# Patient Record
Sex: MALE | Race: WHITE
[De-identification: names, ages, dates, MRNs, and addresses within clinical notes are randomized per-mention and may not be internally consistent; named-entity substitution may affect disease eponyms.]

---

## 2017-01-27 ENCOUNTER — HOSPITAL ENCOUNTER (OUTPATIENT)
Dept: HOSPITAL 83 - SDC | Age: 62
Discharge: HOME | End: 2017-01-27
Attending: INTERNAL MEDICINE
Payer: COMMERCIAL

## 2017-01-27 VITALS — SYSTOLIC BLOOD PRESSURE: 100 MMHG | DIASTOLIC BLOOD PRESSURE: 67 MMHG

## 2017-01-27 VITALS — WEIGHT: 187 LBS | BODY MASS INDEX: 26.77 KG/M2 | HEIGHT: 70 IN

## 2017-01-27 VITALS — DIASTOLIC BLOOD PRESSURE: 74 MMHG | SYSTOLIC BLOOD PRESSURE: 113 MMHG

## 2017-01-27 VITALS — DIASTOLIC BLOOD PRESSURE: 87 MMHG | SYSTOLIC BLOOD PRESSURE: 125 MMHG

## 2017-01-27 VITALS — DIASTOLIC BLOOD PRESSURE: 81 MMHG

## 2017-01-27 DIAGNOSIS — K64.9: ICD-10-CM

## 2017-01-27 DIAGNOSIS — Z82.49: ICD-10-CM

## 2017-01-27 DIAGNOSIS — Z90.49: ICD-10-CM

## 2017-01-27 DIAGNOSIS — K21.9: ICD-10-CM

## 2017-01-27 DIAGNOSIS — K92.1: ICD-10-CM

## 2017-01-27 DIAGNOSIS — K57.30: Primary | ICD-10-CM

## 2020-12-14 ENCOUNTER — HOSPITAL ENCOUNTER (OUTPATIENT)
Dept: HOSPITAL 83 - COVID19 | Age: 65
Discharge: HOME | End: 2020-12-14
Attending: NURSE PRACTITIONER
Payer: COMMERCIAL

## 2020-12-14 DIAGNOSIS — U07.1: Primary | ICD-10-CM

## 2022-04-13 ENCOUNTER — HOSPITAL ENCOUNTER (OUTPATIENT)
Dept: HOSPITAL 83 - SDC | Age: 67
Discharge: HOME | End: 2022-04-13
Attending: OPHTHALMOLOGY
Payer: MEDICARE

## 2022-04-13 VITALS — SYSTOLIC BLOOD PRESSURE: 121 MMHG | DIASTOLIC BLOOD PRESSURE: 82 MMHG

## 2022-04-13 VITALS — BODY MASS INDEX: 26.34 KG/M2 | WEIGHT: 184 LBS | HEIGHT: 70 IN

## 2022-04-13 VITALS — DIASTOLIC BLOOD PRESSURE: 83 MMHG

## 2022-04-13 VITALS — DIASTOLIC BLOOD PRESSURE: 84 MMHG

## 2022-04-13 VITALS — SYSTOLIC BLOOD PRESSURE: 135 MMHG | DIASTOLIC BLOOD PRESSURE: 87 MMHG

## 2022-04-13 DIAGNOSIS — Z79.899: ICD-10-CM

## 2022-04-13 DIAGNOSIS — H25.811: Primary | ICD-10-CM

## 2022-04-13 DIAGNOSIS — K21.9: ICD-10-CM

## 2023-12-05 ENCOUNTER — ANESTHESIA EVENT (OUTPATIENT)
Dept: GASTROENTEROLOGY | Facility: HOSPITAL | Age: 68
End: 2023-12-05
Payer: COMMERCIAL

## 2023-12-05 ENCOUNTER — HOSPITAL ENCOUNTER (OUTPATIENT)
Dept: GASTROENTEROLOGY | Facility: HOSPITAL | Age: 68
Setting detail: OUTPATIENT SURGERY
Discharge: HOME | End: 2023-12-05
Payer: COMMERCIAL

## 2023-12-05 ENCOUNTER — ANESTHESIA (OUTPATIENT)
Dept: GASTROENTEROLOGY | Facility: HOSPITAL | Age: 68
End: 2023-12-05
Payer: COMMERCIAL

## 2023-12-05 VITALS
SYSTOLIC BLOOD PRESSURE: 135 MMHG | TEMPERATURE: 96.8 F | BODY MASS INDEX: 32.65 KG/M2 | OXYGEN SATURATION: 98 % | RESPIRATION RATE: 18 BRPM | DIASTOLIC BLOOD PRESSURE: 80 MMHG | HEART RATE: 74 BPM | HEIGHT: 69 IN | WEIGHT: 220.46 LBS

## 2023-12-05 DIAGNOSIS — Z86.010 H/O ADENOMATOUS POLYP OF COLON: ICD-10-CM

## 2023-12-05 DIAGNOSIS — Z12.11 ENCOUNTER FOR SCREENING FOR MALIGNANT NEOPLASM OF COLON: Primary | ICD-10-CM

## 2023-12-05 LAB
GLUCOSE BLD MANUAL STRIP-MCNC: 104 MG/DL (ref 74–99)
GLUCOSE BLD MANUAL STRIP-MCNC: 118 MG/DL (ref 74–99)

## 2023-12-05 PROCEDURE — 88305 TISSUE EXAM BY PATHOLOGIST: CPT | Mod: TC,SUR,AHULAB | Performed by: INTERNAL MEDICINE

## 2023-12-05 PROCEDURE — 45385 COLONOSCOPY W/LESION REMOVAL: CPT | Performed by: INTERNAL MEDICINE

## 2023-12-05 PROCEDURE — 7100000010 HC PHASE TWO TIME - EACH INCREMENTAL 1 MINUTE

## 2023-12-05 PROCEDURE — A45378 PR COLONOSCOPY,DIAGNOSTIC: Performed by: NURSE ANESTHETIST, CERTIFIED REGISTERED

## 2023-12-05 PROCEDURE — 3700000002 HC GENERAL ANESTHESIA TIME - EACH INCREMENTAL 1 MINUTE

## 2023-12-05 PROCEDURE — 82947 ASSAY GLUCOSE BLOOD QUANT: CPT | Mod: 91

## 2023-12-05 PROCEDURE — A45378 PR COLONOSCOPY,DIAGNOSTIC: Performed by: ANESTHESIOLOGY

## 2023-12-05 PROCEDURE — 88305 TISSUE EXAM BY PATHOLOGIST: CPT | Performed by: PATHOLOGY

## 2023-12-05 PROCEDURE — 2500000004 HC RX 250 GENERAL PHARMACY W/ HCPCS (ALT 636 FOR OP/ED): Performed by: NURSE ANESTHETIST, CERTIFIED REGISTERED

## 2023-12-05 PROCEDURE — 7100000009 HC PHASE TWO TIME - INITIAL BASE CHARGE

## 2023-12-05 PROCEDURE — 3700000001 HC GENERAL ANESTHESIA TIME - INITIAL BASE CHARGE

## 2023-12-05 PROCEDURE — 45380 COLONOSCOPY AND BIOPSY: CPT | Performed by: INTERNAL MEDICINE

## 2023-12-05 RX ORDER — TRIAMCINOLONE ACETONIDE 0.25 MG/G
CREAM TOPICAL
COMMUNITY
Start: 2023-01-26

## 2023-12-05 RX ORDER — FENOFIBRATE 145 MG/1
145 TABLET, FILM COATED ORAL DAILY
COMMUNITY
Start: 2023-11-22

## 2023-12-05 RX ORDER — LANSOPRAZOLE 30 MG/1
30 CAPSULE, DELAYED RELEASE ORAL DAILY
COMMUNITY
Start: 2023-11-27

## 2023-12-05 RX ORDER — SODIUM CHLORIDE, SODIUM LACTATE, POTASSIUM CHLORIDE, CALCIUM CHLORIDE 600; 310; 30; 20 MG/100ML; MG/100ML; MG/100ML; MG/100ML
20 INJECTION, SOLUTION INTRAVENOUS CONTINUOUS
Status: DISCONTINUED | OUTPATIENT
Start: 2023-12-05 | End: 2023-12-06 | Stop reason: HOSPADM

## 2023-12-05 RX ORDER — LISINOPRIL 20 MG/1
20 TABLET ORAL DAILY
COMMUNITY
Start: 2023-11-22

## 2023-12-05 RX ORDER — ATORVASTATIN CALCIUM 40 MG/1
40 TABLET, FILM COATED ORAL DAILY
COMMUNITY
Start: 2023-11-22

## 2023-12-05 RX ORDER — PROPOFOL 10 MG/ML
INJECTION, EMULSION INTRAVENOUS AS NEEDED
Status: DISCONTINUED | OUTPATIENT
Start: 2023-12-05 | End: 2023-12-05

## 2023-12-05 RX ORDER — GLIPIZIDE 5 MG/1
5 TABLET ORAL 2 TIMES DAILY
COMMUNITY
Start: 2023-11-10

## 2023-12-05 RX ORDER — METFORMIN HYDROCHLORIDE 1000 MG/1
1000 TABLET ORAL 2 TIMES DAILY
COMMUNITY
Start: 2023-11-10

## 2023-12-05 RX ORDER — PROPOFOL 10 MG/ML
INJECTION, EMULSION INTRAVENOUS CONTINUOUS PRN
Status: DISCONTINUED | OUTPATIENT
Start: 2023-12-05 | End: 2023-12-05

## 2023-12-05 RX ORDER — DULAGLUTIDE 0.75 MG/.5ML
INJECTION, SOLUTION SUBCUTANEOUS
COMMUNITY
Start: 2023-11-24

## 2023-12-05 RX ORDER — SPIRONOLACTONE 25 MG/1
25 TABLET ORAL 2 TIMES DAILY
COMMUNITY
Start: 2023-11-20

## 2023-12-05 RX ADMIN — PROPOFOL 100 MG: 10 INJECTION, EMULSION INTRAVENOUS at 10:45

## 2023-12-05 RX ADMIN — SODIUM CHLORIDE, SODIUM LACTATE, POTASSIUM CHLORIDE, AND CALCIUM CHLORIDE: 600; 310; 30; 20 INJECTION, SOLUTION INTRAVENOUS at 10:44

## 2023-12-05 RX ADMIN — PROPOFOL 200 MCG/KG/MIN: 10 INJECTION, EMULSION INTRAVENOUS at 10:45

## 2023-12-05 ASSESSMENT — COLUMBIA-SUICIDE SEVERITY RATING SCALE - C-SSRS
6. HAVE YOU EVER DONE ANYTHING, STARTED TO DO ANYTHING, OR PREPARED TO DO ANYTHING TO END YOUR LIFE?: NO
1. IN THE PAST MONTH, HAVE YOU WISHED YOU WERE DEAD OR WISHED YOU COULD GO TO SLEEP AND NOT WAKE UP?: NO
2. HAVE YOU ACTUALLY HAD ANY THOUGHTS OF KILLING YOURSELF?: NO

## 2023-12-05 ASSESSMENT — PAIN SCALES - GENERAL
PAINLEVEL_OUTOF10: 0 - NO PAIN

## 2023-12-05 ASSESSMENT — PAIN - FUNCTIONAL ASSESSMENT
PAIN_FUNCTIONAL_ASSESSMENT: 0-10
PAIN_FUNCTIONAL_ASSESSMENT: 0-10

## 2023-12-05 NOTE — ANESTHESIA PREPROCEDURE EVALUATION
Patient: Kelton Warner    Procedure Information       Date/Time: 12/05/23 1000    Scheduled providers: Emi Patino MD; Joey Bloom MD    Procedure: COLONOSCOPY    Location: [facility]            Relevant Problems   Anesthesia   No history of complications History of anesthesia complications       Clinical information reviewed:   Tobacco  Allergies  Meds   Med Hx  Surg Hx            NPO/Void Status  Date of Last Liquid: 12/05/23  Time of Last Liquid: 0600  Date of Last Solid: 12/03/23  Time of Last Solid: 2300  Last Intake Type: Clear fluids  Time of Last Void: 0600           Past Medical History:   Diagnosis Date    Diabetes mellitus (CMS/HCC)     GERD (gastroesophageal reflux disease)     Hypertension     Hypokalemic periodic paralysis     primarily lower extremity weakness      Past Surgical History:   Procedure Laterality Date    HIATAL HERNIA REPAIR      KNEE ARTHROSCOPY W/ MENISCAL REPAIR      ROTATOR CUFF REPAIR      TONSILLECTOMY       Social History     Tobacco Use    Smoking status: Every Day     Packs/day: .5     Types: Cigarettes    Smokeless tobacco: Never   Substance Use Topics    Alcohol use: Never    Drug use: Never      Current Outpatient Medications   Medication Instructions    atorvastatin (LIPITOR) 40 mg, oral, Daily    dulaglutide (TRULICITY SUBQ) subcutaneous    fenofibrate (TRICOR) 145 mg, oral, Daily    glipiZIDE (GLUCOTROL) 5 mg, oral, 2 times daily    lansoprazole (PREVACID) 30 mg, oral, Daily    lisinopril 20 mg, oral, Daily    metFORMIN (GLUCOPHAGE) 1,000 mg, oral, 2 times daily    spironolactone (ALDACTONE) 25 mg, oral, 2 times daily    triamcinolone (Kenalog) 0.025 % cream APPLY TOPICALLY TWICE DAILY FOR 14 DAYS    Trulicity 0.75 mg/0.5 mL pen injector INJECT 0.5ML SUBCUTANEOUSLY EVERY WEEK      No Known Allergies     Chemistry    Lab Results   Component Value Date/Time     05/10/2022 1039    K 4.3 05/10/2022 1039     05/10/2022 1039    CO2 22  "05/10/2022 1039    BUN 12 05/10/2022 1039    CREATININE 1.00 05/10/2022 1039    Lab Results   Component Value Date/Time    CALCIUM 9.0 05/10/2022 1039          No results found for: \"WBC\", \"HGB\", \"HCT\", \"PLT\"  No results found for: \"PROTIME\", \"PTT\", \"INR\"  No results found for this or any previous visit (from the past 4464 hour(s)).  No results found for this or any previous visit from the past 1095 days.       Visit Vitals  /82   Pulse 84   Temp 36.1 °C (97 °F) (Temporal)   Resp 18   Ht 1.753 m (5' 9\")   Wt 100 kg (220 lb 7.4 oz)   SpO2 97%   BMI 32.56 kg/m²   Smoking Status Every Day   BSA 2.21 m²        Physical Exam    Airway  Mallampati: III  TM distance: >3 FB  Neck ROM: full     Cardiovascular   Rhythm: regular  Rate: normal     Dental - normal exam     Pulmonary   Breath sounds clear to auscultation     Abdominal - normal exam              Anesthesia Plan    ASA 3     MAC     intravenous induction   Anesthetic plan and risks discussed with patient.        "

## 2023-12-05 NOTE — ANESTHESIA POSTPROCEDURE EVALUATION
Patient: Kelton Warner    Procedure Summary       Date: 12/05/23 Room / Location: [facility] [place]    Anesthesia Start: 1044 Anesthesia Stop: 1108    Procedure: COLONOSCOPY Diagnosis:       Encounter for screening for malignant neoplasm of colon      H/O adenomatous polyp of colon      Encounter for screening for malignant neoplasm of colon    Scheduled Providers: Emi Patino MD; Joey Bloom MD Responsible Provider: Joey Bloom MD    Anesthesia Type: MAC ASA Status: 3            Anesthesia Type: MAC    Vitals Value Taken Time   /80 12/05/23 1145   Temp 36 °C (96.8 °F) 12/05/23 1103   Pulse 74 12/05/23 1145   Resp 18 12/05/23 1145   SpO2 98 % 12/05/23 1145       Anesthesia Post Evaluation    Patient location during evaluation: PACU  Patient participation: complete - patient participated  Level of consciousness: awake and alert  Pain management: adequate  Airway patency: patent  Cardiovascular status: acceptable and hemodynamically stable  Respiratory status: acceptable, spontaneous ventilation and nonlabored ventilation  Hydration status: acceptable  Postoperative Nausea and Vomiting: none        There were no known notable events for this encounter.

## 2023-12-05 NOTE — POST-PROCEDURE NOTE
1103 Received patient from the procedure accompanied by RN and AA. Patient sedated upon arrival. Abdomen soft    1115 Patient waking up more, O2 removed, placed on room air. Sister at bedside.    1140 Homegoing instructions reviewed with patient and sister and they verbalized understanding, copies furnished. Patient tolerating ginger ale and crackers.    1205 Dr. Patino at bedside and talked to patient and sister. IV removed without problems    1215 Discharged in satisfactory condition via wheelchair.

## 2023-12-05 NOTE — DISCHARGE INSTRUCTIONS

## 2023-12-05 NOTE — H&P
"History Of Present Illness  Kelton Warner is a 68 y.o. male presenting for surveillance colonoscopy.     H/o hepatic flexure adenoma s/p EMR.     Past Medical History  Past Medical History:   Diagnosis Date    Diabetes mellitus (CMS/HCC)     GERD (gastroesophageal reflux disease)     Hypertension     Hypokalemic periodic paralysis     primarily lower extremity weakness       Surgical History  Past Surgical History:   Procedure Laterality Date    HIATAL HERNIA REPAIR      KNEE ARTHROSCOPY W/ MENISCAL REPAIR      ROTATOR CUFF REPAIR      TONSILLECTOMY          Social History  He reports that he has been smoking cigarettes. He has been smoking an average of .5 packs per day. He has never used smokeless tobacco. He reports that he does not drink alcohol and does not use drugs.    Family History  No family history on file.     Allergies  Patient has no known allergies.    Review of Systems     Physical Exam     Last Recorded Vitals  Blood pressure 161/82, pulse 84, temperature 36.1 °C (97 °F), temperature source Temporal, resp. rate 18, height 1.753 m (5' 9\"), weight 100 kg (220 lb 7.4 oz), SpO2 97 %.    Relevant Results       Assessment/Plan   Proceed with colonoscopy.        I spent 5 minutes in the professional and overall care of this patient.      Emi Patino MD    "

## 2023-12-06 ASSESSMENT — PAIN SCALES - GENERAL: PAINLEVEL_OUTOF10: 0 - NO PAIN

## 2023-12-14 LAB
LABORATORY COMMENT REPORT: NORMAL
PATH REPORT.FINAL DX SPEC: NORMAL
PATH REPORT.GROSS SPEC: NORMAL
PATH REPORT.TOTAL CANCER: NORMAL

## 2023-12-20 ENCOUNTER — TELEPHONE (OUTPATIENT)
Dept: GASTROENTEROLOGY | Facility: HOSPITAL | Age: 68
End: 2023-12-20
Payer: COMMERCIAL

## 2023-12-20 NOTE — TELEPHONE ENCOUNTER
Reviewed colonoscopy pathology results from 12/5/2023 with the patient via phone. Patient is aware to have a repeat colonoscopy in 3 years. Patient verbalized understanding.    ----- Message from Emi Patino MD sent at 12/18/2023 10:51 AM EST -----  Please let patient know results.   Scar (prior polyp removed) looks great, no recurrent polyp.    Other small polyp was an adenoma.   Plan repeat colonoscopy in 3 years.

## 2025-03-03 ENCOUNTER — HOSPITAL ENCOUNTER (OUTPATIENT)
Dept: HOSPITAL 83 - US | Age: 70
Discharge: HOME | End: 2025-03-03
Attending: UROLOGY
Payer: MEDICARE

## 2025-03-03 DIAGNOSIS — N28.1: Primary | ICD-10-CM

## 2025-03-03 DIAGNOSIS — N50.3: ICD-10-CM

## 2025-06-15 ENCOUNTER — HOSPITAL ENCOUNTER (EMERGENCY)
Dept: HOSPITAL 83 - ED | Age: 70
Discharge: HOME | End: 2025-06-15
Payer: MEDICARE

## 2025-06-15 VITALS — BODY MASS INDEX: 25.91 KG/M2 | HEIGHT: 70 IN | WEIGHT: 181 LBS

## 2025-06-15 DIAGNOSIS — L03.114: ICD-10-CM

## 2025-06-15 DIAGNOSIS — Z90.49: ICD-10-CM

## 2025-06-15 DIAGNOSIS — Z98.890: ICD-10-CM

## 2025-06-15 DIAGNOSIS — L03.113: ICD-10-CM

## 2025-06-15 DIAGNOSIS — L25.9: Primary | ICD-10-CM

## 2025-06-15 DIAGNOSIS — Z79.899: ICD-10-CM

## 2025-06-16 ENCOUNTER — HOSPITAL ENCOUNTER (OUTPATIENT)
Dept: HOSPITAL 83 - CARD | Age: 70
Discharge: HOME | End: 2025-06-16
Attending: NURSE PRACTITIONER
Payer: MEDICARE

## 2025-06-16 DIAGNOSIS — Z01.810: Primary | ICD-10-CM

## 2025-06-16 DIAGNOSIS — I45.10: ICD-10-CM
